# Patient Record
Sex: FEMALE | Race: WHITE
[De-identification: names, ages, dates, MRNs, and addresses within clinical notes are randomized per-mention and may not be internally consistent; named-entity substitution may affect disease eponyms.]

---

## 2018-01-14 ENCOUNTER — HOSPITAL ENCOUNTER (EMERGENCY)
Dept: HOSPITAL 24 - ER | Age: 38
Discharge: HOME | End: 2018-01-14
Payer: SELF-PAY

## 2018-01-14 VITALS — DIASTOLIC BLOOD PRESSURE: 78 MMHG | SYSTOLIC BLOOD PRESSURE: 136 MMHG

## 2018-01-14 VITALS — BODY MASS INDEX: 33.3 KG/M2

## 2018-01-14 DIAGNOSIS — J32.0: Primary | ICD-10-CM

## 2018-01-14 DIAGNOSIS — H60.501: ICD-10-CM

## 2018-01-14 PROCEDURE — 96372 THER/PROPH/DIAG INJ SC/IM: CPT

## 2018-01-14 PROCEDURE — 99282 EMERGENCY DEPT VISIT SF MDM: CPT

## 2018-01-14 RX ADMIN — CEFTRIAXONE ONE GM: 1 INJECTION, POWDER, FOR SOLUTION INTRAMUSCULAR; INTRAVENOUS at 10:15

## 2018-01-14 NOTE — DR.GENAD
HPI





- PCP


Primary Care Physician: [facility]





- Complaint/Symptoms


Chief Complaint Doctors Comments: Patient presents to the ED with complaint of 

hoarseness, sorethroat and right cervical tenderness for one day. She also 

complains of right ear pain. She deneis fever, vomitnng or diarrhea. Did not 

get influenza immunization.


Chief Complaint:: PT C/O SINUS ISSUES. PT STATES SHE IS HAVING RT EAR PAIN THAT 

IS RADIATING TO HER RT JAW AND NECK WITH SWOLLEN LYMPH NODE. PT IS NOTED TO BE 

HOARSE. PT STATES HER SYMPTOMS STARTED FRIDAY.


Self Treatment fo Chief Complaint: PT HAS BEEN TAKING OTC MEDICATIONS





- Source


History Provided: Patient





- Mode of Arrival


Mode of Arrival: Ambulatory





- Timing


Onset of Chief Complaint: 18





PMH





- PMH


Past Medical History: No


Past Surgical History: Yes


Surgical History: , Cholecystectomy, Joint Replacement


Past Surgical History Comment: (RT HIP)





- Family History


History of Family Medical Conditions: No





- Social History


Does patient currently use any type of tobacco product: Yes


Have you used tobacco products in the last 12 months: Yes


Type of Tobacco Use: Cigarettes


Does any household member use tobacco: Yes


Alcohol Use: None


Do you use any recreational Drugs:: No


Lives With: Family


Lives Where: Home





- infectious screening


In the last 2 months have you had wt loss of >10#?: NO


Have you had fever, night sweats or hemotysis?: No


Have you traveled outside the country in the last 6 months?: No


Isolation: Standard





ROS





- Review of Systems


Constitutional: negative: Chills, Diaphoresis, Fever


Eyes: No Symptoms Reported


ENTM: No Symptoms Reported


Respiratoy: No Symptoms Reported


Cardiovascular: No Symptoms Reported


Gastrointestinal/Abdominal: No Symptoms Reported


Genitourinary: No Symptoms Reported


Neurological: No Symptoms Reported


Musculoskeletal: No Symptoms Reported


Integumentary: No Symptoms Reported


Hematologic/Lymphatic: No Symptoms Reported


Endocrine: No Symptoms Reported





PE





- Vital Signs


Vitals: 





 





Temperature                      97.7 F


Pulse Rate                       88


Respiratory Rate                 18


Blood Pressure                   136/78


O2 Sat by Pulse Oximetry         100











- General


Limitations: No Limitations


General Appearance: Alert, In No Apparent Distress





- Head


Head Exam: Normal Inspection, Atraumatic





- Eyes


Eye exam: Normal Appearance, PERRL, EOMI





- ENT


ENT Exam: Normal Exam, Normal Oropharynx, TM's Normal Bilaterally, Other (

tenderness to tragal palpation )


External Ear Exam: Pain with Movement, External Tenderness.  negative: 

Auricular Trauma


TM/Canal Exam: Bilateral Normal


Nose Exam: Normal Nose Exam, Sinus Tenderness (right maxillary)


Mouth Exam: Normal Inspection


Throat Exam: Normal Inspection.  negative: Tonsillar Erythema, Tonsillar Exudate

, R Peritonsillar Mass, L Peritonsillar Mass





- Neck


Neck Exam: Tenderness (right cervical w/o adenopathy)





- Chest


Chest Inspection: Normal Inspection





- Respiratory


Respiratory Exam: Normal Lung Sounds Bilat


Respiratory Exam: Bilateral Clear to Auscultation





- Cardiovascular


Cardiovascular Exam: Regular Rate, Normal Rhythm





- Abdominal Exam


Abdominal Exam: Normal Inspection


Abdominal Tenderness: negative: RUQ, RLQ, LUQ, LLQ, Epigastrium, Suprapubic, 

Diffuse, Mild, Moderate, Severe, Other





- Extremities


Extremities Exam: Normal Inspection, Full ROM, Tenderness





- Back


Back Exam: negative: Normal Inspection, Full ROM, Tenderness, (R) CVA Tenderness

, (L) CVA Tenderness, Muscle Spasm, Paraspinal Tenderness, Vertebral Tenderness

, Rashes, (R) Sciatic Notch Tenderness, (L) Sciatic Notch Tendern, (R) Straight 

Leg Raise, (L) Straight Leg Raise, Other





- Neurologic


Neurological Exam: Alert, Oriented X3, CN II-XII Intact





- Psychiatric


Psychiatric Exam: Normal Affect, Normal Mood





- Skin


Skin Exam: Warm, Dry, Intact





- Diagnosis


Discharge Problem: 


 Right maxillary sinusitis





Right otitis externa


Qualifiers:


 Otitis externa type: unspecified type Chronicity: acute Qualified Code(s): 

H60.501 - Unspecified acute noninfective otitis externa, right ear








- Discharge Plan


Condition: Stable





- Follow ups/Referrals


Follow ups/Referrals: 


[facility],None [Primary Care Provider] - 3 days





- Instructions

## 2020-07-25 ENCOUNTER — TELEPHONE ENCOUNTER (OUTPATIENT)
Dept: URBAN - METROPOLITAN AREA CLINIC 13 | Facility: CLINIC | Age: 40
End: 2020-07-25

## 2020-07-26 ENCOUNTER — TELEPHONE ENCOUNTER (OUTPATIENT)
Dept: URBAN - METROPOLITAN AREA CLINIC 13 | Facility: CLINIC | Age: 40
End: 2020-07-26

## 2020-07-26 RX ORDER — GLUC/MSM/COLGN2/HYAL/ANTIARTH3 375-375-20
TAKE 1 TABLET DAILY TABLET ORAL
Refills: 0 | Status: ACTIVE | COMMUNITY
Start: 2006-01-06